# Patient Record
Sex: MALE | Race: WHITE
[De-identification: names, ages, dates, MRNs, and addresses within clinical notes are randomized per-mention and may not be internally consistent; named-entity substitution may affect disease eponyms.]

---

## 2018-04-12 ENCOUNTER — HOSPITAL ENCOUNTER (EMERGENCY)
Dept: HOSPITAL 92 - SCSER | Age: 28
LOS: 1 days | Discharge: HOME | End: 2018-04-13
Payer: SELF-PAY

## 2018-04-12 DIAGNOSIS — F17.210: ICD-10-CM

## 2018-04-12 DIAGNOSIS — S00.83XA: ICD-10-CM

## 2018-04-12 DIAGNOSIS — S40.811A: ICD-10-CM

## 2018-04-12 DIAGNOSIS — S01.312A: Primary | ICD-10-CM
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
## 2018-04-12 DIAGNOSIS — S39.012A: ICD-10-CM

## 2018-04-12 PROCEDURE — 72100 X-RAY EXAM L-S SPINE 2/3 VWS: CPT
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
## 2023-01-01 ENCOUNTER — HOSPITAL ENCOUNTER (EMERGENCY)
Dept: HOSPITAL 92 - CSHERS | Age: 33
Discharge: HOME | End: 2023-01-01
Payer: SELF-PAY
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
## 2023-01-01 DIAGNOSIS — S61.411A: Primary | ICD-10-CM

## 2023-01-01 DIAGNOSIS — F17.210: ICD-10-CM

## 2023-01-01 PROCEDURE — 12002 RPR S/N/AX/GEN/TRNK2.6-7.5CM: CPT

## 2024-05-20 ENCOUNTER — HOSPITAL ENCOUNTER (EMERGENCY)
Dept: HOSPITAL 92 - CSHERS | Age: 34
Discharge: HOME | End: 2024-05-20
Payer: COMMERCIAL

## 2024-05-20 DIAGNOSIS — S60.221A: ICD-10-CM

## 2024-05-20 DIAGNOSIS — F17.210: ICD-10-CM

## 2024-05-20 DIAGNOSIS — S63.501A: Primary | ICD-10-CM
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
## 2024-12-09 ENCOUNTER — HOSPITAL ENCOUNTER (EMERGENCY)
Dept: HOSPITAL 92 - CSHERS | Age: 34
Discharge: HOME | End: 2024-12-09
Payer: COMMERCIAL

## 2024-12-09 DIAGNOSIS — F17.210: ICD-10-CM

## 2024-12-09 DIAGNOSIS — R19.7: ICD-10-CM

## 2024-12-09 DIAGNOSIS — R11.2: Primary | ICD-10-CM

## 2024-12-09 LAB
ANION GAP SERPL CALC-SCNC: 16 MMOL/L (ref 10–20)
BUN SERPL-MCNC: 11 MG/DL (ref 8.9–20.6)
CALCIUM SERPL-MCNC: 9.5 MG/DL (ref 7.8–10.44)
CHLORIDE SERPL-SCNC: 103 MMOL/L (ref 98–107)
CO2 SERPL-SCNC: 24 MMOL/L (ref 22–29)
CREAT CL PREDICTED SERPL C-G-VRATE: 0 ML/MIN (ref 70–130)
GLUCOSE SERPL-MCNC: 131 MG/DL (ref 70–105)
POTASSIUM SERPL-SCNC: 4.3 MMOL/L (ref 3.5–5.1)
SODIUM SERPL-SCNC: 139 MMOL/L (ref 136–145)

## 2024-12-09 PROCEDURE — 96374 THER/PROPH/DIAG INJ IV PUSH: CPT

## 2024-12-09 PROCEDURE — 80048 BASIC METABOLIC PNL TOTAL CA: CPT
